# Patient Record
Sex: MALE | Race: WHITE | NOT HISPANIC OR LATINO
[De-identification: names, ages, dates, MRNs, and addresses within clinical notes are randomized per-mention and may not be internally consistent; named-entity substitution may affect disease eponyms.]

---

## 2017-10-30 ENCOUNTER — TRANSCRIPTION ENCOUNTER (OUTPATIENT)
Age: 52
End: 2017-10-30

## 2017-12-12 ENCOUNTER — APPOINTMENT (OUTPATIENT)
Dept: INTERNAL MEDICINE | Facility: CLINIC | Age: 52
End: 2017-12-12
Payer: COMMERCIAL

## 2017-12-12 VITALS
DIASTOLIC BLOOD PRESSURE: 70 MMHG | BODY MASS INDEX: 27.31 KG/M2 | HEIGHT: 67 IN | WEIGHT: 174 LBS | TEMPERATURE: 97.8 F | SYSTOLIC BLOOD PRESSURE: 130 MMHG

## 2017-12-12 DIAGNOSIS — Z00.00 ENCOUNTER FOR GENERAL ADULT MEDICAL EXAMINATION W/OUT ABNORMAL FINDINGS: ICD-10-CM

## 2017-12-12 DIAGNOSIS — Z80.0 FAMILY HISTORY OF MALIGNANT NEOPLASM OF DIGESTIVE ORGANS: ICD-10-CM

## 2017-12-12 DIAGNOSIS — Z83.79 FAMILY HISTORY OF OTHER DISEASES OF THE DIGESTIVE SYSTEM: ICD-10-CM

## 2017-12-12 PROCEDURE — 99204 OFFICE O/P NEW MOD 45 MIN: CPT

## 2018-01-31 ENCOUNTER — APPOINTMENT (OUTPATIENT)
Dept: INTERNAL MEDICINE | Facility: CLINIC | Age: 53
End: 2018-01-31
Payer: COMMERCIAL

## 2018-01-31 ENCOUNTER — LABORATORY RESULT (OUTPATIENT)
Age: 53
End: 2018-01-31

## 2018-01-31 PROCEDURE — 45378 DIAGNOSTIC COLONOSCOPY: CPT

## 2018-01-31 PROCEDURE — 43239 EGD BIOPSY SINGLE/MULTIPLE: CPT

## 2018-07-24 PROBLEM — Z80.0 FAMILY HISTORY OF COLON CANCER: Status: ACTIVE | Noted: 2017-12-12

## 2020-01-23 ENCOUNTER — NON-APPOINTMENT (OUTPATIENT)
Age: 55
End: 2020-01-23

## 2020-01-23 ENCOUNTER — APPOINTMENT (OUTPATIENT)
Dept: OPHTHALMOLOGY | Facility: CLINIC | Age: 55
End: 2020-01-23
Payer: COMMERCIAL

## 2020-01-23 PROCEDURE — 92004 COMPRE OPH EXAM NEW PT 1/>: CPT

## 2020-01-23 PROCEDURE — 92201 OPSCPY EXTND RTA DRAW UNI/BI: CPT | Mod: LT

## 2020-06-22 ENCOUNTER — RESULT REVIEW (OUTPATIENT)
Age: 55
End: 2020-06-22

## 2022-06-20 ENCOUNTER — APPOINTMENT (OUTPATIENT)
Dept: DISASTER EMERGENCY | Facility: HOSPITAL | Age: 57
End: 2022-06-20

## 2022-06-20 ENCOUNTER — OUTPATIENT (OUTPATIENT)
Dept: OUTPATIENT SERVICES | Facility: HOSPITAL | Age: 57
LOS: 1 days | End: 2022-06-20

## 2022-06-20 ENCOUNTER — TRANSCRIPTION ENCOUNTER (OUTPATIENT)
Age: 57
End: 2022-06-20

## 2022-06-20 VITALS
HEART RATE: 99 BPM | TEMPERATURE: 99 F | DIASTOLIC BLOOD PRESSURE: 81 MMHG | RESPIRATION RATE: 19 BRPM | OXYGEN SATURATION: 99 % | WEIGHT: 175.05 LBS | SYSTOLIC BLOOD PRESSURE: 116 MMHG | HEIGHT: 67 IN

## 2022-06-20 VITALS
DIASTOLIC BLOOD PRESSURE: 96 MMHG | SYSTOLIC BLOOD PRESSURE: 141 MMHG | OXYGEN SATURATION: 99 % | RESPIRATION RATE: 18 BRPM | TEMPERATURE: 99 F | HEART RATE: 83 BPM

## 2022-06-20 DIAGNOSIS — U07.1 COVID-19: ICD-10-CM

## 2022-06-20 RX ORDER — BEBTELOVIMAB 87.5 MG/ML
175 INJECTION, SOLUTION INTRAVENOUS ONCE
Refills: 0 | Status: COMPLETED | OUTPATIENT
Start: 2022-06-20 | End: 2022-06-20

## 2022-06-20 RX ADMIN — BEBTELOVIMAB 175 MILLIGRAM(S): 87.5 INJECTION, SOLUTION INTRAVENOUS at 15:25

## 2022-06-20 NOTE — MONOCLONAL ANTIBODY INFUSION - EXAM
Scheduled H&P 6/18/2018  PATs scheduled 6/14/2018   CC: Monoclonal Antibody Infusion/COVID 19 Positive  57yMale    exam/findings:  T(C): 37.3 (06-20-22 @ 15:40), Max: 37.4 (06-20-22 @ 14:41)  HR: 83 (06-20-22 @ 15:40) (83 - 99)  BP: 125/86 (06-20-22 @ 15:40) (116/81 - 125/86)  RR: 18 (06-20-22 @ 15:40) (18 - 19)  SpO2: 99% (06-20-22 @ 15:40) (99% - 99%)      PE:   Appearance: NAD	  HEENT:   Normal oral mucosa,   Lymphatic: No lymphadenopathy  Cardiovascular: Normal S1 S2, No JVD, No murmurs, No edema  Respiratory: Lungs clear to auscultation	  Gastrointestinal:  Soft, Non-tender, + BS	  Skin: warm and dry  Neurologic: Non-focal  Extremities: Normal range of motion,

## 2022-06-20 NOTE — MONOCLONAL ANTIBODY INFUSION - ASSESSMENT AND PLAN
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  Patient states he has been experiencing fevre, cough, sore throat, malaise, and nasal congestion since 6/17/22. He denies any CP, SOB, chills, numbness/tingling in b/l limbs, loss of sensation or motor function, N/V/D. Pt is vaccinated and boosted with Moderna x 3.        PLAN:  - Injection procedure explained to patient   - Consent for monoclonal antibody injection obtained   - Risk & benefits discussed/all questions answered  - Inject Bebtelovimab 175 mg over 1 minute.  - Observe patient for one hour post administration    I have reviewed the Bebtelovimab Emergency Use Authorization (EUA) and I have provided the patient or patient's caregiver with the following information:    1. FDA has authorized emergency use Bebtelovimab, which is not an FDA-approved biological product.  2. The patient or patient's caregiver has the option to accept or refuse administration of Bebtelovimab.  3. The significant known and potential risks and benefits of Bebtelovimab and the extent to which such risks and benefits are unknown.  4. Information on available alternative treatments and risks and benefits of those alternatives.    The patient's COVID monoclonal antibody injection administration went well without any complications. The patient tolerated the treatment without any reactions. Vitals were stable throughout the injection & post-injection administration. The pt denies any CP, fevers, chills, SOB, numbness/tingling in b/l limbs, loss of sensation or motor function, N/V/D while receiving the injection. Patient denies any symptoms an hour after post injection. Vitals were taken post injection and were stable. Pt is medically stable to be discharged home. Discharge instructions were provided to the patient with a fact sheet included. Patient was instructed to self-isolate and use infection control measures (e.g wear mask, isolate, social distance, avoid sharing personal items, clean and disinfect "high touch" surfaces, and frequent handwashing according to the CDC guidelines. The patient was informed on what symptoms to be aware of for the next couple of days, and if there are any issues to call the 24/7 clinical call center. Patient was instructed to follow up with PCP as needed.

## 2022-06-21 ENCOUNTER — TRANSCRIPTION ENCOUNTER (OUTPATIENT)
Age: 57
End: 2022-06-21

## 2022-11-22 ENCOUNTER — NON-APPOINTMENT (OUTPATIENT)
Age: 57
End: 2022-11-22

## 2022-11-23 DIAGNOSIS — Z00.00 ENCOUNTER FOR GENERAL ADULT MEDICAL EXAMINATION W/OUT ABNORMAL FINDINGS: ICD-10-CM

## 2022-11-25 ENCOUNTER — NON-APPOINTMENT (OUTPATIENT)
Age: 57
End: 2022-11-25

## 2022-11-25 ENCOUNTER — APPOINTMENT (OUTPATIENT)
Dept: MRI IMAGING | Facility: CLINIC | Age: 57
End: 2022-11-25

## 2022-11-25 ENCOUNTER — APPOINTMENT (OUTPATIENT)
Dept: ORTHOPEDIC SURGERY | Facility: CLINIC | Age: 57
End: 2022-11-25
Payer: COMMERCIAL

## 2022-11-25 ENCOUNTER — OUTPATIENT (OUTPATIENT)
Dept: OUTPATIENT SERVICES | Facility: HOSPITAL | Age: 57
LOS: 1 days | End: 2022-11-25
Payer: COMMERCIAL

## 2022-11-25 ENCOUNTER — TRANSCRIPTION ENCOUNTER (OUTPATIENT)
Age: 57
End: 2022-11-25

## 2022-11-25 VITALS
HEIGHT: 67 IN | SYSTOLIC BLOOD PRESSURE: 144 MMHG | WEIGHT: 175 LBS | DIASTOLIC BLOOD PRESSURE: 85 MMHG | HEART RATE: 69 BPM | BODY MASS INDEX: 27.47 KG/M2

## 2022-11-25 DIAGNOSIS — M25.552 PAIN IN LEFT HIP: ICD-10-CM

## 2022-11-25 PROCEDURE — 73721 MRI JNT OF LWR EXTRE W/O DYE: CPT

## 2022-11-25 PROCEDURE — 99205 OFFICE O/P NEW HI 60 MIN: CPT

## 2022-11-25 PROCEDURE — 73502 X-RAY EXAM HIP UNI 2-3 VIEWS: CPT

## 2022-11-25 PROCEDURE — 73721 MRI JNT OF LWR EXTRE W/O DYE: CPT | Mod: 26,LT

## 2022-11-25 NOTE — HISTORY OF PRESENT ILLNESS
[de-identified] : This is very nice 57-year-old gentleman experiencing left lateral hip pain, which is severe in intensity.  He has no prior hip pain.  1 week ago he was playing tennis and twisted a certain direction felt a significant pop in the lateral aspect of his left hip will play tennis.  He is a pediatric ER doctor.  No pain in the groin.  Not taking NSAIDs for this.  Initially could not lay on his side when it happened but now he is able to lie on his side a little bit more but is still quite discomforting.  He walks with a waddling gait now.  He is not using a cane or walker.  The pain substantially limits activities of daily living. Walking tolerance is reduced. The patient denies any radiation of the pain to the feet and it is not associated with numbness, tingling, or weakness.

## 2022-11-25 NOTE — PHYSICAL EXAM
[de-identified] : Patient is well nourished, well-developed, in no acute distress, with appropriate mood and affect. The patient is oriented to time, place, and person. Respirations are even and unlabored. Gait evaluation does not reveal a limp. There is no inguinal adenopathy. Examination of the contralateral hip shows normal range of motion, strength, no tenderness, and intact skin. The affected limb is well-perfused and showed 2+ dp/pt pulses, without skin lesions, shows a grossly normal motor and sensory examination. Examination of the hip shows no skin lesions. Hip motion is full and painless from 0-90 degrees extension to flexion, 20 degrees adduction and 20 degrees abduction, and 15 degrees internal and 30 degrees external rotation. Leg lengths are approximately equal. FADIR is negative and GERALDINE is positive. Stinchfield test is negative. Both hips are stable and muscle strength is normal with good strength with resisted abduction and adduction. Pedal pulses are palpable.  Tender to palpation over the lateral aspect of the hip.  Full strength in the abductor complex.  Negative Trendelenburg sign. [de-identified] : AP and lateral x-rays of the left hip, pelvis, and femur were ordered and taken in the office and demonstrate no evidence of degenerative joint disease of the hip with maintained joint space and no evidence of fractures or other intraarticular pathology.

## 2022-11-25 NOTE — DISCUSSION/SUMMARY
[de-identified] : This patient has left lateral hip pain consistent with partial versus full-thickness abductor tendon tear.  However his compensatory mechanisms are excellent and he has good abduction strength so it may just represent a partial tear.  The patient is not an appropriate candidate for surgical intervention at this time. An extensive discussion was conducted on the natural history of the disease and the variety of surgical and non-surgical options available to the patient including, but not limited to non-steroidal anti-inflammatory medications, steroid injections, physical therapy, maintenance of ideal body weight, and reduction of activity.  The patient will be sent for a MRI of the left hip to evaluate for abductor tear. They will notify me when the MRI is complete and we will arrange for either an in person or telehealth virtual visit to review the results as well as any next steps in the plan.  I offered a prescription for Mobic but he prefers to take over-the-counter NSAIDs as needed pain.  He should do stretching.  We will obtain the MRI and then we will discuss neck steps.\par \par Later in the morning the patient had actually obtained his MRI.  I reviewed the MRI imaging with the radiologist in person and read this is demonstrating a left hip iliotibial band partial mild to moderate grade rupture without full-thickness rupture.  There is extensive edema around this area.  There is also an incidental finding of an anterior superior labral tear.\par \par I then spoke with the patient on the phone at length.  We discussed the diagnosis of a left iliotibial band partial-thickness rupture as well as a small anterosuperior labral tear.  We discussed that the labral tear is likely an incidental finding as he has no symptoms consistent with this on my examination or subjectively.  His symptoms are entirely consistent with the IT band being the problem.  He will be prescribed formal physical therapy.  He can take NSAIDs for pain.  We did advise the patient that it can take 6 to 8 weeks for healing.  Several questions sought and answered.

## 2023-02-23 ENCOUNTER — APPOINTMENT (OUTPATIENT)
Dept: ORTHOPEDIC SURGERY | Facility: CLINIC | Age: 58
End: 2023-02-23
Payer: COMMERCIAL

## 2023-02-23 VITALS — BODY MASS INDEX: 27 KG/M2 | HEIGHT: 67 IN | WEIGHT: 172 LBS

## 2023-02-23 DIAGNOSIS — M25.551 PAIN IN RIGHT HIP: ICD-10-CM

## 2023-02-23 PROCEDURE — 99214 OFFICE O/P EST MOD 30 MIN: CPT

## 2023-02-23 PROCEDURE — 73502 X-RAY EXAM HIP UNI 2-3 VIEWS: CPT

## 2023-02-23 NOTE — HISTORY OF PRESENT ILLNESS
[de-identified] : This is very nice 57-year-old gentleman experiencing right hip and groin pain, which is severe in intensity. The pain somewhat limits activities of daily living. Walking tolerance is not reduced. Has been diagnosed with degenerative changes in the right hip in the past. 2 prior cortisone injections >2 years ago help significantly. Does not use a cane or walker. Some pain with exercise such as tennis. Motrin is mildly helpful.  The patient denies any radiation of the pain to the feet and it is not associated with numbness, tingling, or weakness.

## 2023-02-23 NOTE — DISCUSSION/SUMMARY
[de-identified] : This patient has right hip pain.  Pain is consistent with a likely labral tear.  This would likely be degenerative.  The patient is not an appropriate candidate for surgical intervention at this time. An extensive discussion was conducted on the natural history of the disease and the variety of surgical and non-surgical options available to the patient including, but not limited to non-steroidal anti-inflammatory medications, steroid injections, physical therapy, maintenance of ideal body weight, and reduction of activity.  I prescribed a right hip intra-articular cortisone injection to be performed by interventional radiology. He does not want to take mobic.  The patient will schedule an appointment as needed.\par

## 2023-02-23 NOTE — PHYSICAL EXAM
[de-identified] : Patient is well nourished, well-developed, in no acute distress, with appropriate mood and affect. The patient is oriented to time, place, and person. Respirations are even and unlabored. Gait evaluation does not reveal a limp. There is no inguinal adenopathy. Examination of the contralateral hip shows normal range of motion, strength, no tenderness, and intact skin. The affected limb is well-perfused and showed 2+ dp/pt pulses, without skin lesions, shows a grossly normal motor and sensory examination. Examination of the hip shows no skin lesions. Hip motion is full and painless from 0-90 degrees extension to flexion, 20 degrees adduction and 20 degrees abduction, and 15 degrees internal and 30 degrees external rotation. Leg lengths are approximately equal. FADIR is mildly positive and GERALDINE is negative. Stinchfield test is negative. Both hips are stable and muscle strength is normal with good strength with resisted abduction and adduction. Pedal pulses are palpable. [de-identified] : AP and lateral x-rays of the right hip, pelvis, and femur were ordered and taken in the office and demonstrate no evidence of degenerative joint disease of the hip with maintained joint space and no evidence of fractures or other intraarticular pathology.

## 2023-02-24 ENCOUNTER — APPOINTMENT (OUTPATIENT)
Dept: RADIOLOGY | Facility: CLINIC | Age: 58
End: 2023-02-24
Payer: COMMERCIAL

## 2023-02-24 ENCOUNTER — RESULT REVIEW (OUTPATIENT)
Age: 58
End: 2023-02-24

## 2023-02-24 ENCOUNTER — OUTPATIENT (OUTPATIENT)
Dept: OUTPATIENT SERVICES | Facility: HOSPITAL | Age: 58
LOS: 1 days | End: 2023-02-24
Payer: COMMERCIAL

## 2023-02-24 DIAGNOSIS — M25.551 PAIN IN RIGHT HIP: ICD-10-CM

## 2023-02-24 PROCEDURE — 27093 INJECTION FOR HIP X-RAY: CPT | Mod: RT

## 2023-02-24 PROCEDURE — 73525 CONTRAST X-RAY OF HIP: CPT | Mod: 26,RT

## 2023-02-24 PROCEDURE — 27093 INJECTION FOR HIP X-RAY: CPT

## 2023-02-24 PROCEDURE — 73525 CONTRAST X-RAY OF HIP: CPT

## 2023-06-25 ENCOUNTER — EMERGENCY (EMERGENCY)
Facility: HOSPITAL | Age: 58
LOS: 1 days | Discharge: HOME | End: 2023-06-25
Attending: EMERGENCY MEDICINE
Payer: COMMERCIAL

## 2023-06-25 VITALS
WEIGHT: 175.05 LBS | HEIGHT: 67 IN | DIASTOLIC BLOOD PRESSURE: 124 MMHG | SYSTOLIC BLOOD PRESSURE: 222 MMHG | OXYGEN SATURATION: 97 % | RESPIRATION RATE: 18 BRPM | HEART RATE: 81 BPM | TEMPERATURE: 98 F

## 2023-06-25 VITALS
SYSTOLIC BLOOD PRESSURE: 178 MMHG | RESPIRATION RATE: 16 BRPM | HEART RATE: 66 BPM | OXYGEN SATURATION: 100 % | DIASTOLIC BLOOD PRESSURE: 102 MMHG

## 2023-06-25 LAB
ALBUMIN SERPL ELPH-MCNC: 4.5 G/DL — SIGNIFICANT CHANGE UP (ref 3.3–5)
ALP SERPL-CCNC: 49 U/L — SIGNIFICANT CHANGE UP (ref 40–120)
ALT FLD-CCNC: 34 U/L — SIGNIFICANT CHANGE UP (ref 10–45)
ANION GAP SERPL CALC-SCNC: 10 MMOL/L — SIGNIFICANT CHANGE UP (ref 5–17)
APPEARANCE UR: CLEAR — SIGNIFICANT CHANGE UP
APTT BLD: 30.8 SEC — SIGNIFICANT CHANGE UP (ref 27.5–35.5)
AST SERPL-CCNC: 27 U/L — SIGNIFICANT CHANGE UP (ref 10–40)
BACTERIA # UR AUTO: NEGATIVE — SIGNIFICANT CHANGE UP
BASOPHILS # BLD AUTO: 0.03 K/UL — SIGNIFICANT CHANGE UP (ref 0–0.2)
BASOPHILS NFR BLD AUTO: 0.5 % — SIGNIFICANT CHANGE UP (ref 0–2)
BILIRUB SERPL-MCNC: 0.6 MG/DL — SIGNIFICANT CHANGE UP (ref 0.2–1.2)
BILIRUB UR-MCNC: NEGATIVE — SIGNIFICANT CHANGE UP
BUN SERPL-MCNC: 15 MG/DL — SIGNIFICANT CHANGE UP (ref 7–23)
CALCIUM SERPL-MCNC: 10 MG/DL — SIGNIFICANT CHANGE UP (ref 8.4–10.5)
CHLORIDE SERPL-SCNC: 103 MMOL/L — SIGNIFICANT CHANGE UP (ref 96–108)
CO2 SERPL-SCNC: 29 MMOL/L — SIGNIFICANT CHANGE UP (ref 22–31)
COLOR SPEC: COLORLESS — SIGNIFICANT CHANGE UP
CREAT SERPL-MCNC: 1.14 MG/DL — SIGNIFICANT CHANGE UP (ref 0.5–1.3)
DIFF PNL FLD: NEGATIVE — SIGNIFICANT CHANGE UP
EGFR: 74 ML/MIN/1.73M2 — SIGNIFICANT CHANGE UP
EOSINOPHIL # BLD AUTO: 0.14 K/UL — SIGNIFICANT CHANGE UP (ref 0–0.5)
EOSINOPHIL NFR BLD AUTO: 2.3 % — SIGNIFICANT CHANGE UP (ref 0–6)
EPI CELLS # UR: 0 /HPF — SIGNIFICANT CHANGE UP
GLUCOSE SERPL-MCNC: 108 MG/DL — HIGH (ref 70–99)
GLUCOSE UR QL: NEGATIVE — SIGNIFICANT CHANGE UP
HCT VFR BLD CALC: 41.3 % — SIGNIFICANT CHANGE UP (ref 39–50)
HGB BLD-MCNC: 14.2 G/DL — SIGNIFICANT CHANGE UP (ref 13–17)
HYALINE CASTS # UR AUTO: 0 /LPF — SIGNIFICANT CHANGE UP (ref 0–2)
IMM GRANULOCYTES NFR BLD AUTO: 0.7 % — SIGNIFICANT CHANGE UP (ref 0–0.9)
INR BLD: 0.92 RATIO — SIGNIFICANT CHANGE UP (ref 0.88–1.16)
KETONES UR-MCNC: NEGATIVE — SIGNIFICANT CHANGE UP
LEUKOCYTE ESTERASE UR-ACNC: NEGATIVE — SIGNIFICANT CHANGE UP
LYMPHOCYTES # BLD AUTO: 2.03 K/UL — SIGNIFICANT CHANGE UP (ref 1–3.3)
LYMPHOCYTES # BLD AUTO: 33.5 % — SIGNIFICANT CHANGE UP (ref 13–44)
MCHC RBC-ENTMCNC: 30.2 PG — SIGNIFICANT CHANGE UP (ref 27–34)
MCHC RBC-ENTMCNC: 34.4 GM/DL — SIGNIFICANT CHANGE UP (ref 32–36)
MCV RBC AUTO: 87.9 FL — SIGNIFICANT CHANGE UP (ref 80–100)
MONOCYTES # BLD AUTO: 0.46 K/UL — SIGNIFICANT CHANGE UP (ref 0–0.9)
MONOCYTES NFR BLD AUTO: 7.6 % — SIGNIFICANT CHANGE UP (ref 2–14)
NEUTROPHILS # BLD AUTO: 3.36 K/UL — SIGNIFICANT CHANGE UP (ref 1.8–7.4)
NEUTROPHILS NFR BLD AUTO: 55.4 % — SIGNIFICANT CHANGE UP (ref 43–77)
NITRITE UR-MCNC: NEGATIVE — SIGNIFICANT CHANGE UP
NRBC # BLD: 0 /100 WBCS — SIGNIFICANT CHANGE UP (ref 0–0)
PH UR: 8 — SIGNIFICANT CHANGE UP (ref 5–8)
PLATELET # BLD AUTO: 179 K/UL — SIGNIFICANT CHANGE UP (ref 150–400)
POTASSIUM SERPL-MCNC: 4 MMOL/L — SIGNIFICANT CHANGE UP (ref 3.5–5.3)
POTASSIUM SERPL-SCNC: 4 MMOL/L — SIGNIFICANT CHANGE UP (ref 3.5–5.3)
PROT SERPL-MCNC: 7 G/DL — SIGNIFICANT CHANGE UP (ref 6–8.3)
PROT UR-MCNC: NEGATIVE — SIGNIFICANT CHANGE UP
PROTHROM AB SERPL-ACNC: 10.7 SEC — SIGNIFICANT CHANGE UP (ref 10.5–13.4)
RBC # BLD: 4.7 M/UL — SIGNIFICANT CHANGE UP (ref 4.2–5.8)
RBC # FLD: 12.3 % — SIGNIFICANT CHANGE UP (ref 10.3–14.5)
RBC CASTS # UR COMP ASSIST: 3 /HPF — SIGNIFICANT CHANGE UP (ref 0–4)
SODIUM SERPL-SCNC: 142 MMOL/L — SIGNIFICANT CHANGE UP (ref 135–145)
SP GR SPEC: 1.02 — SIGNIFICANT CHANGE UP (ref 1.01–1.02)
UROBILINOGEN FLD QL: NEGATIVE — SIGNIFICANT CHANGE UP
WBC # BLD: 6.06 K/UL — SIGNIFICANT CHANGE UP (ref 3.8–10.5)
WBC # FLD AUTO: 6.06 K/UL — SIGNIFICANT CHANGE UP (ref 3.8–10.5)
WBC UR QL: 1 /HPF — SIGNIFICANT CHANGE UP (ref 0–5)

## 2023-06-25 PROCEDURE — 96375 TX/PRO/DX INJ NEW DRUG ADDON: CPT | Mod: XU

## 2023-06-25 PROCEDURE — 99285 EMERGENCY DEPT VISIT HI MDM: CPT

## 2023-06-25 PROCEDURE — 85025 COMPLETE CBC W/AUTO DIFF WBC: CPT

## 2023-06-25 PROCEDURE — 70450 CT HEAD/BRAIN W/O DYE: CPT | Mod: MA

## 2023-06-25 PROCEDURE — 80053 COMPREHEN METABOLIC PANEL: CPT

## 2023-06-25 PROCEDURE — 96374 THER/PROPH/DIAG INJ IV PUSH: CPT | Mod: XU

## 2023-06-25 PROCEDURE — 36415 COLL VENOUS BLD VENIPUNCTURE: CPT

## 2023-06-25 PROCEDURE — 93005 ELECTROCARDIOGRAM TRACING: CPT

## 2023-06-25 PROCEDURE — 81001 URINALYSIS AUTO W/SCOPE: CPT

## 2023-06-25 PROCEDURE — 70496 CT ANGIOGRAPHY HEAD: CPT | Mod: MA

## 2023-06-25 PROCEDURE — 85610 PROTHROMBIN TIME: CPT

## 2023-06-25 PROCEDURE — 85730 THROMBOPLASTIN TIME PARTIAL: CPT

## 2023-06-25 PROCEDURE — 70496 CT ANGIOGRAPHY HEAD: CPT | Mod: 26,MA

## 2023-06-25 PROCEDURE — 99285 EMERGENCY DEPT VISIT HI MDM: CPT | Mod: 25

## 2023-06-25 RX ORDER — DIPHENHYDRAMINE HCL 50 MG
25 CAPSULE ORAL ONCE
Refills: 0 | Status: COMPLETED | OUTPATIENT
Start: 2023-06-25 | End: 2023-06-25

## 2023-06-25 RX ORDER — MAGNESIUM SULFATE 500 MG/ML
1 VIAL (ML) INJECTION ONCE
Refills: 0 | Status: COMPLETED | OUTPATIENT
Start: 2023-06-25 | End: 2023-06-25

## 2023-06-25 RX ORDER — ACETAMINOPHEN 500 MG
1000 TABLET ORAL ONCE
Refills: 0 | Status: COMPLETED | OUTPATIENT
Start: 2023-06-25 | End: 2023-06-25

## 2023-06-25 RX ORDER — SODIUM CHLORIDE 9 MG/ML
1000 INJECTION INTRAMUSCULAR; INTRAVENOUS; SUBCUTANEOUS ONCE
Refills: 0 | Status: COMPLETED | OUTPATIENT
Start: 2023-06-25 | End: 2023-06-25

## 2023-06-25 RX ORDER — KETOROLAC TROMETHAMINE 30 MG/ML
15 SYRINGE (ML) INJECTION ONCE
Refills: 0 | Status: DISCONTINUED | OUTPATIENT
Start: 2023-06-25 | End: 2023-06-25

## 2023-06-25 RX ORDER — METOCLOPRAMIDE HCL 10 MG
10 TABLET ORAL ONCE
Refills: 0 | Status: COMPLETED | OUTPATIENT
Start: 2023-06-25 | End: 2023-06-25

## 2023-06-25 RX ADMIN — Medication 10 MILLIGRAM(S): at 08:19

## 2023-06-25 RX ADMIN — SODIUM CHLORIDE 1000 MILLILITER(S): 9 INJECTION INTRAMUSCULAR; INTRAVENOUS; SUBCUTANEOUS at 08:04

## 2023-06-25 RX ADMIN — Medication 15 MILLIGRAM(S): at 10:03

## 2023-06-25 RX ADMIN — Medication 1000 MILLIGRAM(S): at 09:06

## 2023-06-25 RX ADMIN — Medication 400 MILLIGRAM(S): at 08:04

## 2023-06-25 RX ADMIN — Medication 100 GRAM(S): at 09:16

## 2023-06-25 NOTE — ED ADULT NURSE NOTE - OBJECTIVE STATEMENT
57 y/o  male arrives to the ER complaining of hypertension. Pt reports waking up from sleep with headache 2 hours ago. Reports some dizziness and nausea. Pt reports taking his BP at home and finding to be hypertensive, systolic 220. reports taking 200 mg Motrin before arrival. Pt denies SOB, chest pain. On assessment pt is well appearing, A&Ox4, speaking coherently, airway is patent, breathing spontaneously and unlabored. Skin is dry, warm. Abdomen is soft, no distended, no tender. Full ROM in all extremities.  Patient undressed and placed into gown, side rails up with bed locked and in lowest position for safety. call bell within reach. [place] provided. Comfort and safety provided. will continue to reassess. 57 y/o  male arrives to the ER complaining of hypertension. Pt reports waking up from sleep with headache 2 hours ago. Reports some dizziness and nausea. Pt reports taking his BP at home and finding to be hypertensive, systolic 220. reports taking 200 mg Motrin before arrival. Pt denies SOB, chest pain. At arrival Pt is hypertensive Systolic 180's. ER MD Gates at bedside. Pt is well appearing, A&Ox4, speaking coherently, following commands, airway is patent, breathing spontaneously and unlabored. Skin is dry, warm. Abdomen is soft, no distended, no tender. Strong equal strength bilaterally x 4, sensory in tact, ambulating independently with steady gait. Patient undressed and placed into gown, side rails up with bed locked and in lowest position for safety. call bell within reach. [place] provided. Comfort and safety provided. will continue to reassess.

## 2023-06-25 NOTE — ED PROVIDER NOTE - PATIENT PORTAL LINK FT
You can access the FollowMyHealth Patient Portal offered by [facility] by registering at the following website: [email or web address]. By joining [facility]’s FollowMyHealth portal, you will also be able to view your health information using other applications (apps) compatible with our system.

## 2023-06-25 NOTE — ED PROVIDER NOTE - ATTENDING CONTRIBUTION TO CARE
Attending MD Gail Gates:  I personally have seen and examined this patient.  Resident note reviewed and agree on plan of care and except where noted.  See HPI, PE, and MDM for details.

## 2023-06-25 NOTE — ED PROVIDER NOTE - CLINICAL SUMMARY MEDICAL DECISION MAKING FREE TEXT BOX
58-year-old male, no reported past medical history, PEM attending, presenting with sudden onset headache ~2hr PTA, neurologically intact here but hypertensive, no evidence of end organ dysfunction, will obtain CT/CTA head to r/o SAH and aneurysm as patient currently within 6hr window. Dispo pending work up and reassessment following symptomatic treatment with migraine cocktail. 58-year-old male, no reported past medical history, PEM attending, presenting with sudden onset headache ~2hr PTA, neurologically intact here but hypertensive, no evidence of end organ dysfunction, will obtain CT/CTA head to r/o SAH and aneurysm as patient currently within 6hr window. Dispo pending work up and reassessment following symptomatic treatment with migraine cocktail.  Attending Gail Gates: 59 yo male without sig pmh presenting with headache. upon arrival pt awake and neuro intake. vital signs with evidence of hypertension. pt went to bed last night and felt well and this am when he woke up had severe headaches. pt has a h/o headaches in the past but never this extreme. last known normal without headache was last evening. concern for possible SAH given severe headache and ct scan called to expedite. no fevers or neck pain and no muscle aches or rash making lyme or encephalitis or meningitis less likely. ct, cta performed which was negative. less likely  hypertensive emergency, no further signs of end organ damage and htn improved from initial to in the room. suspect that headache likely contributing to htn. pt given treatment for migraine with resolution of discomfort. neuro eam nonfocal. d/w pt needs to follow up with his pcp for BP and close return precautions. for any worsening headache, numbness, chest pain or further concerns

## 2023-06-25 NOTE — ED PROVIDER NOTE - PROGRESS NOTE DETAILS
Attending Gail Gates: ct scans negative for acute hemorrhage or aneurysm making sah less likely. will continue t monitor. no futher signs of end organ damage from HTN. HTN could be secondary to pain , will continue to treat headache and monitor. Jt PGY-3: Patient reassessed, resting comfortably, pain significantly improved. Repeat BP now 181/102, ~20% decrease from initial BP on arrival. No signs of end organ dysfunction. No indication to initiate antihypertensive from the ED, patient has reliable f/u with his PMD/cardiologist Dr. Martinez, will see within few days. Strict return precautions given.

## 2023-06-25 NOTE — ED PROVIDER NOTE - PHYSICAL EXAMINATION
Gen - Uncomfortable appearing but nontoxic; A+Ox3   HEENT - NCAT, EOMI  Neck - supple, no neck stiffness  Resp - CTAB, no increased WOB  CV -  RRR, no m/r/g  Abd - soft, NT, ND; no guarding or rebound  MSK - FROM of b/l UE and LE, no gross deformities  Extrem - no LE edema/erythema/tenderness  Neuro - CN2-12 grossly intact, full motor strength and sensation to LT throughout, normal finger to nose  Skin - warm, well perfused Gen - Uncomfortable appearing but nontoxic; A+Ox3   HEENT - NCAT, EOMI  Neck - supple, no neck stiffness  Resp - CTAB, no increased WOB  CV -  RRR, no m/r/g  Abd - soft, NT, ND; no guarding or rebound  MSK - FROM of b/l UE and LE, no gross deformities  Extrem - no LE edema/erythema/tenderness  Neuro - CN2-12 grossly intact, full motor strength and sensation to LT throughout, normal finger to nose  Skin - warm, well perfused  Attending Gail Gates: Gen: NAD, heent: atrauamtic, eomi, perrla, mmm, op pink,neck; nttp, no nuchal rigidity, chest: nttp, no crepitus, cv: rrr, no murmurs, lungs: ctab, abd: soft, nontender, nondistended, no peritoneal signs no guarding, ext: wwp, neg homans, skin: no rash, neuro: awake and alert, following commands, speech clear, sensation and strength intact, no focal deficits

## 2023-06-25 NOTE — ED ADULT NURSE REASSESSMENT NOTE - NS ED NURSE REASSESS COMMENT FT1
Patient being transported to cat scan with transport personnel. Patient stable upon leaving floor.
Patient is awake, alert, a&ox3, following commands, strong equal strength bilaterally x 4, sensory in tact, ambulating independently with steady gait.  Pt reports headache has improved after meds.

## 2023-06-25 NOTE — ED PROVIDER NOTE - NSFOLLOWUPINSTRUCTIONS_ED_ALL_ED_FT
You were seen in the Emergency Department for: headache    You may continue to take take Tylenol (acetaminophen) 1000 mg every 6 hours, AND/OR Advil (ibuprofen) 600 mg every 8 hours.    Please follow up with your primary physician as discussed. If you do not have a primary physician or specialist of your needs, please call [phone] to find one convenient for you. At this number you will be able to locate a provider who accepts your insurance, as well as locate the right specialist for your needs.    You should return to the Emergency Department if you feel any new/worsening/persistent symptoms including but not limited to: chest pain, difficulty breathing, loss of consciousness, bleeding, uncontrolled pain, numbness/weakness of a body part

## 2023-06-25 NOTE — ED PROVIDER NOTE - OBJECTIVE STATEMENT
58-year-old male, no reported past medical history, PEM attending, presenting with sudden onset headache x1 day.  States having onset 2 hours prior to arrival, severe and global in nature, woke him from sleep, took blood pressures at home found to be elevated to 220s systolic, took 200 mg Motrin and came to ED for eval.  Does not have longstanding history of headaches although has had prior sinus headaches before.  Reports associated dizziness and some nausea but denies vomiting, fevers, chills, neck stiffness, focal weakness or numbness, difficulty walking.

## 2023-06-25 NOTE — ED PROVIDER NOTE - NSICDXPASTSURGICALHX_GEN_ALL_CORE_FT
PAST SURGICAL HISTORY:  History of arthroscopy right shoulder- 2002     History of tonsillectomy

## 2023-06-25 NOTE — ED ADULT TRIAGE NOTE - CHIEF COMPLAINT QUOTE
woke up from sleep with headache x 2hrs took BP at home and was hypertensive. no hx of HTN, took 600 Motrin

## 2023-06-25 NOTE — ED ADULT NURSE NOTE - NSFALLUNIVINTERV_ED_ALL_ED
Bed/Stretcher in lowest position, wheels locked, appropriate side rails in place/Call bell, personal items and telephone in reach/Instruct patient to call for assistance before getting out of bed/chair/stretcher/Non-slip footwear applied when patient is off stretcher/[place] to call system/Physically safe environment - no spills, clutter or unnecessary equipment/Purposeful proactive rounding/Room/bathroom lighting operational, light cord in reach

## 2024-01-02 ENCOUNTER — APPOINTMENT (OUTPATIENT)
Dept: OPHTHALMOLOGY | Facility: CLINIC | Age: 59
End: 2024-01-02
Payer: COMMERCIAL

## 2024-01-02 PROCEDURE — 92004 COMPRE OPH EXAM NEW PT 1/>: CPT
